# Patient Record
Sex: MALE
[De-identification: names, ages, dates, MRNs, and addresses within clinical notes are randomized per-mention and may not be internally consistent; named-entity substitution may affect disease eponyms.]

---

## 2019-12-01 ENCOUNTER — HOSPITAL ENCOUNTER (EMERGENCY)
Dept: HOSPITAL 97 - ER | Age: 24
Discharge: HOME | End: 2019-12-01
Payer: SELF-PAY

## 2019-12-01 VITALS — TEMPERATURE: 97.2 F | OXYGEN SATURATION: 98 %

## 2019-12-01 VITALS — SYSTOLIC BLOOD PRESSURE: 131 MMHG | DIASTOLIC BLOOD PRESSURE: 70 MMHG
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
## 2019-12-01 DIAGNOSIS — Z23: ICD-10-CM
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
## 2019-12-01 DIAGNOSIS — S62.635A: Primary | ICD-10-CM

## 2019-12-01 PROCEDURE — 90471 IMMUNIZATION ADMIN: CPT

## 2019-12-01 PROCEDURE — 99284 EMERGENCY DEPT VISIT MOD MDM: CPT

## 2019-12-01 PROCEDURE — 0HBQXZZ EXCISION OF FINGER NAIL, EXTERNAL APPROACH: ICD-10-PCS

## 2019-12-01 PROCEDURE — 90714 TD VACC NO PRESV 7 YRS+ IM: CPT

## 2019-12-01 NOTE — ER
Nurse's Notes                                                                                     

 [facility]                                                                 

Name: Beny Young                                                                             

Age: 24 yrs                                                                                       

Sex: Male                                                                                         

: 1995                                                                                   

MRN: [id number]                                                                                   

Arrival Date: 2019                                                                          

Time: 11:34                                                                                       

Account#: [id number]                                                                            

Bed 25                                                                                            

Private MD:                                                                                       

Diagnosis: Left fourth finger distal tuft fracture                                                

                                                                                                  

Presentation:                                                                                     

                                                                                             

11:44 Presenting complaint: Patient states: He slammed his finger in a metal door at work.    aj1 

      Laceration noted to left ring finger, swelling and bruising noted to tip of left ring       

      finger. Transition of care: patient was not received from another setting of care.          

      Onset of symptoms was 2019. Risk Assessment: Do you want to hurt yourself      

      or someone else? Patient reports no desire to harm self or others. Initial Sepsis           

      Screen: Does the patient meet any 2 criteria? No. Patient's initial sepsis screen is        

      negative. Does the patient have a suspected source of infection? No. Patient's initial      

      sepsis screen is negative. Care prior to arrival: None.                                     

11:44 Method Of Arrival: Ambulatory                                                           aj1 

11:44 Acuity: KHLOE 4                                                                           aj1 

                                                                                                  

Triage Assessment:                                                                                

11:45 General: Appears in no apparent distress. uncomfortable, Behavior is calm, cooperative, aj1 

      appropriate for age. Pain: Complains of pain in left ring fingernail Pain currently is      

      8 out of 10 on a pain scale. Neuro: Level of Consciousness is awake, alert, obeys           

      commands, Oriented to person, place, time, situation. Cardiovascular: Patient's skin is     

      warm and dry. Respiratory: Airway is patent Respiratory effort is even, unlabored,          

      Respiratory pattern is regular, symmetrical.                                                

                                                                                                  

Historical:                                                                                       

- Allergies:                                                                                      

11:45 No Known Allergies;                                                                     aj1 

- Home Meds:                                                                                      

11:45 None [Active];                                                                          aj1 

- PMHx:                                                                                           

11:45 Asthma; sepsis;                                                                         aj1 

- PSHx:                                                                                           

11:45 Appendectomy;                                                                           aj1 

                                                                                                  

- Immunization history:: Last tetanus immunization: up to date Flu vaccine is not up to           

  date.                                                                                           

- Social history:: Smoking status: .                                                              

- Ebola Screening: : Patient denies travel to an Ebola-affected area in the 21 days               

  before illness onset.                                                                           

                                                                                                  

                                                                                                  

Screenin:28 Abuse screen: Denies threats or abuse. Denies injuries from another. Nutritional        mg2 

      screening: No deficits noted. Tuberculosis screening: No symptoms or risk factors           

      identified. Fall Risk None identified.                                                      

                                                                                                  

Assessment:                                                                                       

12:26 General: Appears in no apparent distress. comfortable, Behavior is calm, cooperative.   mg2 

      Pain: Complains of pain in left ring fingernail Pain does not radiate. Pain currently       

      is 8 out of 10 on a pain scale. Quality of pain is described as aching, Pain began          

      suddenly, 2 hours ago. Is intermittent. Neuro: Level of Consciousness is awake, alert,      

      obeys commands, Oriented to person, place, time, situation. Cardiovascular: Capillary       

      refill < 3 seconds Patient's skin is warm and dry. Respiratory: Airway is patent            

      Respiratory effort is even, unlabored, Respiratory pattern is regular, symmetrical. GI:     

      No signs and/or symptoms were reported involving the gastrointestinal system. : No        

      signs and/or symptoms were reported regarding the genitourinary system. EENT: No signs      

      and/or symptoms were reported regarding the EENT system. Derm: Skin is pink, warm \T\       

      dry. normal, Wound noted left ring fingernail. Musculoskeletal: Circulation, motion,        

      and sensation intact. Capillary refill < 3 seconds. Injury Description: Crush injury        

      sustained to left ring fingernail was sustained 1-2 hours ago.                              

13:52 Reassessment: Patient appears in no apparent distress at this time. Patient is alert,   mg2 

      oriented x 3, equal unlabored respirations, skin warm/dry/pink.                             

                                                                                                  

Vital Signs:                                                                                      

11:45  / 76; Pulse 94; Resp 18; Temp 97.2(TE); Pulse Ox 98% on R/A; Weight 92.99 kg     aj1 

      (R); Height 6 ft. 0 in. (182.88 cm) (R); Pain 0/10;                                         

13:52  / 70; Pulse 53; Resp 18; Pulse Ox 98% on R/A;                                    mg2 

11:45 Body Mass Index 27.80 (92.99 kg, 182.88 cm)                                             aj1 

                                                                                                  

ED Course:                                                                                        

11:34 Patient arrived in ED.                                                                  as  

11:45 Triage completed.                                                                       aj1 

11:45 Arm band placed on Patient placed in waiting room, Patient notified of wait time.       aj1 

11:59 Ton Nixon NP is PHCP.                                                           pm1 

11:59 Tavo Lynn MD is Attending Physician.                                             pm1 

12:04 José Galdamez RN is Primary Nurse.                                                  mg2 

12:16 Bed in low position. Call light in reach. Verbal reassurance given. Pulse ox on. NIBP   jp3 

      on.                                                                                         

12:16 Patient maintains SpO2 saturation greater than 95% on room air.                         jp3 

12:28 Patient did not have IV access during this emergency room visit.                        mg2 

13:01 X-ray(s) taken.                                                                         jp3 

13:03 Hand Left 3 View XRAY In Process Unspecified.                                           EDMS

14:17 Assist provider with nail repair of excision of nail. of left ring finger using         mg2 

      excision of nail. Set up for procedure. Performed by Ton Nixon NP Dressed with        

      surgiseal applied Patient tolerated well. splint checked by the provider prior to dc.       

      Aluminum finger splint applied to left ring fingernail.                                     

14:18 Alfonzo Andujar MD is Referral Physician.                                              pm1 

                                                                                                  

Administered Medications:                                                                         

12:19 Drug: Tetanus-Diphtheria Toxoid Adult 0.5 ml {: [employer]. Exp:         mg2 

      2021. Lot #: [id number]. } Route: IM; Site: left deltoid;                                  

13:19 Follow up: Response: No adverse reaction                                                mg2 

12:19 Drug: Norco 10 mg-325 mg 1 tabs Route: PO;                                              mg2 

13:18 Follow up: Response: No adverse reaction; Marked relief of symptoms                     mg2 

13:50 Drug: Lidocaine (1 %) 5 ml {Note: applied by the provider.} Volume: 5 ml; Route:        mg2 

      Infiltration;                                                                               

14:17 Follow up: Response: No adverse reaction                                                mg2 

                                                                                                  

                                                                                                  

Outcome:                                                                                          

14:20 Discharge ordered by MD.                                                                pm1 

14:33 Discharged to home ambulatory.                                                          mg2 

14:33 Condition: stable                                                                           

14:33 Discharge instructions given to patient, Instructed on discharge instructions, follow       

      up and referral plans. medication usage, wound care, Demonstrated understanding of          

      instructions, follow-up care, medications, splint care, Prescriptions given X 2.            

14:35 Patient left the ED.                                                                    mg2 

                                                                                                  

Signatures:                                                                                       

Dispatcher [employer]                           EDMS                                                 

Ena Thayer RN                     RN   aj1                                                  

Edda Raza                             as                                                   

Ton Nixon, CARISA                    NP   pm1                                                  

José Galdamez RN                    RN   mg2                                                  

Juventino Pierre                              jp3                                                  

                                                                                                  

Corrections: (The following items were deleted from the chart)                                    

14:19 12:28 No provider procedures requiring assistance completed. mg2                        mg2 

                                                                                                  

**************************************************************************************************

## 2019-12-01 NOTE — RAD REPORT
EXAM DESCRIPTION:  RAD - Hand Left 3 View - 12/1/2019 1:02 pm

 

CLINICAL HISTORY:  PAIN

 

COMPARISON:  No comparisons

 

FINDINGS:  Fourth digit tuft fracture is present adjacent soft tissue swelling.
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
